# Patient Record
Sex: MALE | Race: WHITE
[De-identification: names, ages, dates, MRNs, and addresses within clinical notes are randomized per-mention and may not be internally consistent; named-entity substitution may affect disease eponyms.]

---

## 2017-06-11 ENCOUNTER — HOSPITAL ENCOUNTER (EMERGENCY)
Dept: HOSPITAL 17 - PHEFT | Age: 47
Discharge: HOME | End: 2017-06-11
Payer: MEDICARE

## 2017-06-11 VITALS
DIASTOLIC BLOOD PRESSURE: 99 MMHG | TEMPERATURE: 98.2 F | OXYGEN SATURATION: 96 % | RESPIRATION RATE: 16 BRPM | HEART RATE: 74 BPM | SYSTOLIC BLOOD PRESSURE: 137 MMHG

## 2017-06-11 VITALS — WEIGHT: 222.67 LBS | HEIGHT: 71 IN | BODY MASS INDEX: 31.17 KG/M2

## 2017-06-11 DIAGNOSIS — I10: ICD-10-CM

## 2017-06-11 DIAGNOSIS — M16.12: Primary | ICD-10-CM

## 2017-06-11 DIAGNOSIS — F17.210: ICD-10-CM

## 2017-06-11 PROCEDURE — 99284 EMERGENCY DEPT VISIT MOD MDM: CPT

## 2017-06-11 PROCEDURE — 73501 X-RAY EXAM HIP UNI 1 VIEW: CPT

## 2017-06-11 PROCEDURE — 96372 THER/PROPH/DIAG INJ SC/IM: CPT

## 2017-06-11 NOTE — PD
HPI


Chief Complaint:  Musculoskeletal Complaint


Time Seen by Provider:  10:25


Travel History


International Travel<30 days:  No


Contact w/Intl Traveler<30days:  No


Traveled to known affect area:  No





History of Present Illness


HPI


Patient presents with aggravated left hip pain.  Normally uses a cane for a bad 

back.  Denies any misstep or fall.  States it is painful to bear weight.





PFSH


Past Medical History


Hypertension:  Yes





Social History


Alcohol Use:  Yes (OCC)


Tobacco Use:  Yes (1/2-1 PPD)


Substance Use:  No





Allergies-Medications


(Allergen,Severity, Reaction):  


Coded Allergies:  


     Morphine (Unverified  Allergy, Severe, CONVULSIONS, 6/11/17)


Reported Meds & Prescriptions





Reported Meds & Active Scripts


Active


Reported


Lisinopril 2.5 Mg Tab 2.5 Mg PO BID








Review of Systems


General / Constitutional:  No: Fever


Eyes:  No: Visual changes


HENT:  No: Headaches


Cardiovascular:  No: Chest Pain or Discomfort


Respiratory:  No: Shortness of Breath


Gastrointestinal:  No: Abdominal Pain


Genitourinary:  No: Dysuria


Musculoskeletal:  No: Pain


Skin:  No Rash


Neurologic:  No: Weakness


Psychiatric:  No: Depression


Endocrine:  No: Polydipsia


Hematologic/Lymphatic:  No: Easy Bruising





Physical Exam


Narrative


GENERAL: Well-nourished, well-developed patient.


SKIN: Focused skin assessment warm/dry.


HEAD: Normocephalic.


EYES: No scleral icterus. No injection or drainage. 


NECK: Supple, trachea midline. No JVD or lymphadenopathy.


CARDIOVASCULAR: Regular rate and rhythm without murmurs, gallops, or rubs. 


RESPIRATORY: Breath sounds equal bilaterally. No accessory muscle use.


GASTROINTESTINAL: Abdomen soft, non-tender, nondistended. 


MUSCULOSKELETAL: No cyanosis, or edema. 


BACK: Nontender without obvious deformity. No CVA tenderness. 


Examination left hip reveals no pain on flexion extension, discomfort with 

internal and external rotation





Data


Data


Last Documented VS





Vital Signs








  Date Time  Temp Pulse Resp B/P Pulse Ox O2 Delivery O2 Flow Rate FiO2


 


6/11/17 09:59 98.2 74 16 137/99 96   








Orders





 Hip, Lat Only W Ap Pelvis (6/11/17 )








MDM


Medical Decision Making


Medical Screen Exam Complete:  Yes


Emergency Medical Condition:  Yes


Differential Diagnosis


Left hip fracture, left hip osteoarthritis, left hip bursitis


Narrative Course


Assessment and plan discussed with patient bedside.  Films are negative for 

acute fracture or dislocation.





Diagnosis





 Primary Impression:  


 Osteoarthritis of left hip


 Qualified Code:  M16.12 - Osteoarthritis of left hip, unspecified 

osteoarthritis type


Patient Instructions:  General Instructions





***Additional Instructions:


Nonsteroidal anti-inflammatories ice after activity, gentle range of motion 

exercises.  Follow-up with PCP.  Return to emergency room with any new onset of 

symptoms.  Continue cane use.


***Med/Other Pt SpecificInfo:  Prescription(s) given


Scripts


Hydrocodone-Acetaminophen 5-325 mg Tab1 Tab PO Q6H PRN (PAIN) #30 TAB  Ref 0


   Prov:Jude Wilson MD         6/11/17


Disposition:  01 DISCHARGE HOME


Condition:  Good








Jude Wilson MD Jun 11, 2017 10:33

## 2017-06-11 NOTE — RADHPO
EXAM DATE/TIME:  06/11/2017 10:32 

 

[facility] COMPARISON:     

No previous studies available for comparison.

 

                     

INDICATIONS :     

Sudden onset of left hip area pain, pain increasing in severity

                     

 

MEDICAL HISTORY :     

None.          

 

SURGICAL HISTORY :     

None.   

 

ENCOUNTER:     

Initial                                        

 

ACUITY:     

3 days      

 

PAIN SCORE:     

10/10

 

LOCATION:     

Left  hip

 

FINDINGS:     

A lateral view of the left hip with AP pelvis was obtained.  No definite fractures, dislocations, lyt
ic or sclerotic lesions are seen.  The joint space is well maintained.

 

CONCLUSION:     

Negative for fracture or dislocation. Follow up in 7-10 days is suggested if symptoms persist..

 

 

 

 Johnnie Valle MD FACR on June 11, 2017 at 11:02           

Board Certified Radiologist.

 This report was verified electronically.

## 2018-04-25 ENCOUNTER — HOSPITAL ENCOUNTER (EMERGENCY)
Dept: HOSPITAL 17 - PHEFT | Age: 48
Discharge: HOME | End: 2018-04-25
Payer: MEDICARE

## 2018-04-25 VITALS — WEIGHT: 220.46 LBS | HEIGHT: 71 IN | BODY MASS INDEX: 30.86 KG/M2

## 2018-04-25 VITALS
HEART RATE: 95 BPM | TEMPERATURE: 97.6 F | SYSTOLIC BLOOD PRESSURE: 150 MMHG | OXYGEN SATURATION: 94 % | RESPIRATION RATE: 16 BRPM | DIASTOLIC BLOOD PRESSURE: 90 MMHG

## 2018-04-25 DIAGNOSIS — F17.200: ICD-10-CM

## 2018-04-25 DIAGNOSIS — M25.511: Primary | ICD-10-CM

## 2018-04-25 PROCEDURE — 99283 EMERGENCY DEPT VISIT LOW MDM: CPT

## 2018-04-25 PROCEDURE — 73030 X-RAY EXAM OF SHOULDER: CPT

## 2018-04-25 PROCEDURE — 96372 THER/PROPH/DIAG INJ SC/IM: CPT
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
